# Patient Record
Sex: MALE | Race: WHITE | URBAN - METROPOLITAN AREA
[De-identification: names, ages, dates, MRNs, and addresses within clinical notes are randomized per-mention and may not be internally consistent; named-entity substitution may affect disease eponyms.]

---

## 2018-02-07 ENCOUNTER — OFFICE (OUTPATIENT)
Dept: URBAN - METROPOLITAN AREA OTHER 6 | Facility: OTHER | Age: 43
End: 2018-02-07

## 2018-02-07 VITALS
WEIGHT: 260 LBS | HEART RATE: 96 BPM | SYSTOLIC BLOOD PRESSURE: 120 MMHG | HEIGHT: 70 IN | DIASTOLIC BLOOD PRESSURE: 80 MMHG

## 2018-02-07 DIAGNOSIS — R10.32 LEFT LOWER QUADRANT PAIN: ICD-10-CM

## 2018-02-07 DIAGNOSIS — K57.32 DIVERTICULITIS OF LARGE INTESTINE WITHOUT PERFORATION OR ABS: ICD-10-CM

## 2018-02-07 PROCEDURE — 99213 OFFICE O/P EST LOW 20 MIN: CPT | Performed by: INTERNAL MEDICINE

## 2018-02-07 RX ORDER — LEVOFLOXACIN 500 MG/1
TABLET, FILM COATED ORAL
Qty: 7 | Refills: 0 | Status: COMPLETED
Start: 2018-02-07 | End: 2024-08-07

## 2018-02-07 RX ORDER — METRONIDAZOLE 500 MG/1
TABLET ORAL
Qty: 30 | Refills: 0 | Status: COMPLETED
End: 2024-08-07

## 2018-02-26 ENCOUNTER — OFFICE (OUTPATIENT)
Dept: URBAN - METROPOLITAN AREA OTHER 6 | Facility: OTHER | Age: 43
End: 2018-02-26
Payer: COMMERCIAL

## 2018-02-28 ENCOUNTER — OFFICE VISIT (OUTPATIENT)
Dept: SURGERY | Facility: CLINIC | Age: 43
End: 2018-02-28

## 2018-02-28 VITALS — BODY MASS INDEX: 37.22 KG/M2 | WEIGHT: 260 LBS | HEART RATE: 84 BPM | HEIGHT: 70 IN | OXYGEN SATURATION: 98 %

## 2018-02-28 DIAGNOSIS — K57.20 DIVERTICULITIS OF LARGE INTESTINE WITH ABSCESS WITHOUT BLEEDING: Primary | ICD-10-CM

## 2018-02-28 PROCEDURE — 99204 OFFICE O/P NEW MOD 45 MIN: CPT | Performed by: SURGERY

## 2018-02-28 RX ORDER — METRONIDAZOLE 500 MG/1
TABLET ORAL
Refills: 0 | COMMUNITY
Start: 2018-02-08 | End: 2018-03-21

## 2018-02-28 RX ORDER — LEVOFLOXACIN 500 MG/1
500 TABLET, FILM COATED ORAL DAILY
COMMUNITY
Start: 2018-02-06 | End: 2018-03-21

## 2018-02-28 NOTE — PROGRESS NOTES
CHIEF COMPLAINT:    Diverticulitis with abscess    HISTORY OF PRESENT ILLNESS:    Cody Gong is a 42 y.o. male who has a diverticulitis with intramural abscess of the sigmoid colon.  His symptoms started in late January.  He has been having episodes of diverticulitis for a few years.  He had some Flagyl on hand at home and started it around January 29, 2018.  He did not notice any improvement and went to see his primary doctor on 2/5/2018.  She started him on Bactrim in addition to the Flagyl.  She wanted to avoid Levaquin because he has Achilles tendinitis.  CT scan of the abdomen and pelvis were performed on 2/6/2018 that shows diverticulitis with an abscess.  He was instructed to come back to the hospital.  He was switched to Levaquin and Flagyl and discharged from the hospital.  The antibiotics were discontinued yesterday.  He feels better, but not back to completely normal.  There is still some left lower quadrant pain.  Bowel movements are loose.  He has not ventured much from a liquid diet.  There is no blood in stool.  There is no fever.     Past Medical History:   Diagnosis Date   • Asthma    • Diverticulitis    • Diverticulosis    • Hypertension        Past Surgical History:   Procedure Laterality Date   • APPENDECTOMY     • CHOLECYSTECTOMY     • COLONOSCOPY N/A 8/12/2016    Procedure: COLONOSCOPY to cecum;  Surgeon: Anand Zhang MD;  Location: [facility] ENDOSCOPY;  Service:    • COLONOSCOPY N/A 08/12/2011    Normal ileum, normal colon-Dr. Anand Zhang         Current Outpatient Prescriptions:   •  benazepril (LOTENSIN) 20 MG tablet, Take 20 mg by mouth daily., Disp: , Rfl:   •  levoFLOXacin (LEVAQUIN) 500 MG tablet, Take 500 mg by mouth Daily., Disp: , Rfl:   •  Loratadine (CLARITIN) 10 MG capsule, Take 1 tablet by mouth As Needed., Disp: , Rfl:   •  metroNIDAZOLE (FLAGYL) 500 MG tablet, TK 1 T PO TID FOR 10 DAYS UTD, Disp: , Rfl: 0  •  montelukast (SINGULAIR) 10 MG tablet, Take 10 mg by mouth every  "night., Disp: , Rfl:   •  vitamin E 400 UNIT capsule, Take 400 Units by mouth daily., Disp: , Rfl:   •  Flaxseed, Linseed, (FLAX SEED OIL) 1000 MG capsule, Take 1 capsule by mouth daily., Disp: , Rfl:     No Known Allergies    Family History   Problem Relation Age of Onset   • Colon polyps Mother    • Diabetes Mother    • Hypertension Mother    • Colon polyps Father    • Diabetes Father    • Hypertension Father        Social History     Social History   • Marital status:      Spouse name: N/A   • Number of children: N/A   • Years of education: N/A     Occupational History   • Not on file.     Social History Main Topics   • Smoking status: Former Smoker     Quit date: 8/12/2013   • Smokeless tobacco: Never Used   • Alcohol use 1.2 oz/week     2 Cans of beer per week   • Drug use: No   • Sexual activity: Defer     Other Topics Concern   • Not on file     Social History Narrative       Review of Systems   Gastrointestinal: Positive for abdominal pain and diarrhea.        Reflux   All other systems reviewed and are negative.      Objective     Pulse 84  Ht 177.8 cm (70\")  Wt 118 kg (260 lb)  SpO2 98%  BMI 37.31 kg/m2    Physical Exam   Constitutional: He is oriented to person, place, and time. He appears well-developed and well-nourished. No distress.   HENT:   Head: Normocephalic and atraumatic.   Right Ear: External ear normal.   Left Ear: External ear normal.   Nose: Nose normal.   Eyes: Conjunctivae are normal. No scleral icterus.   Neck: Neck supple. No tracheal deviation present.   Cardiovascular: Normal rate, regular rhythm and normal heart sounds.  Exam reveals no friction rub.    No murmur heard.  Pulmonary/Chest: Effort normal and breath sounds normal. No respiratory distress. He has no wheezes. He has no rales.   Abdominal: Soft. Bowel sounds are normal. He exhibits no distension and no mass. There is tenderness (mild left lower quadrant). There is no rebound and no guarding. No hernia. "   Musculoskeletal: He exhibits no edema.   Lymphadenopathy:     He has no cervical adenopathy.   Neurological: He is alert and oriented to person, place, and time.   Skin: Skin is warm and dry.   Psychiatric: He has a normal mood and affect. His behavior is normal. Thought content normal.   Nursing note and vitals reviewed.      DIAGNOSTIC DATA:    I reviewed the report of the CT scan of the abdomen and pelvis performed on 2/6/2018 at [facility] showing wall thickening, pericolonic inflammation, and a mural abscess of the sigmoid colon.  The report says that it is in a similar location as an episode of diverticulitis identified on CT imaging in 2011.    I reviewed his laboratory evaluation    ASSESSMENT:    Recurrent diverticulitis with perforation    PLAN:    I've recommended getting a CT imaging study now that he is off antibiotics to evaluate the site of the abscess and to evaluate the pericolonic inflammatory changes to assess the degree of improvement.  Ultimately, he will need to have a sigmoid colectomy, and we discussed the surgical procedure today in the office.  If the pericolonic inflammatory changes and abscess are improved, I think it is preferable to arrange for a laparoscopic procedure, with a low expectation for conversion to an open procedure or need for colostomy.    I will call him after I see the CT scan.

## 2018-03-08 ENCOUNTER — HOSPITAL ENCOUNTER (OUTPATIENT)
Dept: CT IMAGING | Facility: HOSPITAL | Age: 43
Discharge: HOME | End: 2018-03-08
Attending: SURGERY | Admitting: SURGERY

## 2018-03-08 DIAGNOSIS — K57.20 DIVERTICULITIS OF LARGE INTESTINE WITH ABSCESS WITHOUT BLEEDING: ICD-10-CM

## 2018-03-08 LAB — CREAT BLDA-MCNC: 0.9 MG/DL (ref 0.6–1.3)

## 2018-03-08 PROCEDURE — 0 IOPAMIDOL 61 % SOLUTION: Performed by: SURGERY

## 2018-03-08 PROCEDURE — 82565 ASSAY OF CREATININE: CPT

## 2018-03-08 PROCEDURE — 74177 CT ABD & PELVIS W/CONTRAST: CPT

## 2018-03-08 RX ADMIN — IOPAMIDOL 85 ML: 612 INJECTION, SOLUTION INTRAVENOUS at 10:00

## 2018-03-13 ENCOUNTER — TELEPHONE (OUTPATIENT)
Dept: SURGERY | Facility: CLINIC | Age: 43
End: 2018-03-13

## 2018-03-14 ENCOUNTER — TELEPHONE (OUTPATIENT)
Dept: SURGERY | Facility: CLINIC | Age: 43
End: 2018-03-14

## 2018-03-14 NOTE — TELEPHONE ENCOUNTER
I called him to talk about his CT scan.  The new scan shows that almost all of the inflammation is gone.   I would like to see him in the office in a week or two for another exam to decide about surgery.

## 2018-03-21 ENCOUNTER — OFFICE VISIT (OUTPATIENT)
Dept: SURGERY | Facility: CLINIC | Age: 43
End: 2018-03-21

## 2018-03-21 DIAGNOSIS — K57.20 DIVERTICULITIS OF LARGE INTESTINE WITH ABSCESS WITHOUT BLEEDING: Primary | ICD-10-CM

## 2018-03-21 PROCEDURE — 99213 OFFICE O/P EST LOW 20 MIN: CPT | Performed by: SURGERY

## 2018-03-22 NOTE — PROGRESS NOTES
CHIEF COMPLAINT:    Diverticulitis with abscess    HISTORY OF PRESENT ILLNESS:    Cody Gong is a 42 y.o. male who had diverticulitis with an intramural abscess of the sigmoid colon.  His symptoms started in late January.  He has been having episodes of diverticulitis for a few years.  He came to see me for the first time on 2/28/2018 for an opinion about surgery. He had his most recent flare in January.  He was admitted briefly to [facility].  He improved.  He is now off antibiotics.  At the beginning of it, he had a CT imaging study that showed diverticulitis with an intramural abscess.    At his previous appointment with me, he was not moving forward with his diet.  He was still having lower abdominal pain.  He was still having loose bowel movements.  This is now markedly improved.  He is eating normal food.  There is little if any pain.  Bowel movements are returning to normal. There is no blood in stool.  There is no fever.    I sent him for follow-up CT scan on 3/8/2018.  It shows diverticulosis.  There is very minimal stranding adjacent to the sigmoid colon where the recent episode of diverticulitis was located.    EXAM:    Alert. Oriented.  Lungs: Clear.  No wheezing.  No rales.  Heart: Regular.  Abdomen: Soft.  Nondistended.  Minimal tenderness in the left lower quadrant.  Bowel sounds are normal.  Extremities: Warm.     I reviewed the images and the report of a CT scan of the abdomen and pelvis performed on 3/8/2018.  I compared them to his prior images performed at [facility] on 2/6/2018.  There is considerable improvement- in fact I would call it resolution- of the inflammatory changes surrounding sigmoid colon.    ASSESSMENT:    Diverticulitis with abscess     PLAN:    We had a conversation today in the office regarding his past symptoms.  It is not clear to me from the location and quality of his prior symptoms that they were always diverticulitis of the sigmoid  colon.  Since the inflammatory changes seem to have completely resolved on the CT images, and his pain has nearly completely resolved, I have recommended a more conservative approach than I did at our last office visit.  We discussed the use of fiber supplements.  We talked about increasing his intake of water and maintaining better hydration.    He is going to call me if there is persistent or recurrent abdominal pain.

## 2021-02-02 ENCOUNTER — IMMUNIZATION (OUTPATIENT)
Dept: VACCINE CLINIC | Facility: HOSPITAL | Age: 46
End: 2021-02-02

## 2021-02-02 PROCEDURE — 0001A: CPT | Performed by: INTERNAL MEDICINE

## 2021-02-02 PROCEDURE — 91300 HC SARSCOV02 VAC 30MCG/0.3ML IM: CPT | Performed by: INTERNAL MEDICINE

## 2021-02-23 ENCOUNTER — IMMUNIZATION (OUTPATIENT)
Dept: VACCINE CLINIC | Facility: HOSPITAL | Age: 46
End: 2021-02-23

## 2021-02-23 PROCEDURE — 0002A: CPT | Performed by: INTERNAL MEDICINE

## 2021-02-23 PROCEDURE — 91300 HC SARSCOV02 VAC 30MCG/0.3ML IM: CPT | Performed by: INTERNAL MEDICINE

## 2021-05-05 ENCOUNTER — LAB REQUISITION (OUTPATIENT)
Dept: LAB | Facility: HOSPITAL | Age: 46
End: 2021-05-05

## 2021-05-05 DIAGNOSIS — Z00.00 ENCOUNTER FOR GENERAL ADULT MEDICAL EXAMINATION WITHOUT ABNORMAL FINDINGS: ICD-10-CM

## 2021-05-05 LAB — SARS-COV-2 ORF1AB RESP QL NAA+PROBE: NOT DETECTED

## 2021-05-05 PROCEDURE — U0004 COV-19 TEST NON-CDC HGH THRU: HCPCS | Performed by: INTERNAL MEDICINE

## 2021-05-10 ENCOUNTER — AMBULATORY SURGICAL CENTER (OUTPATIENT)
Dept: URBAN - METROPOLITAN AREA SURGERY 20 | Facility: SURGERY | Age: 46
End: 2021-05-10
Payer: COMMERCIAL

## 2021-05-10 ENCOUNTER — OFFICE (OUTPATIENT)
Dept: URBAN - METROPOLITAN AREA PATHOLOGY 4 | Facility: PATHOLOGY | Age: 46
End: 2021-05-10
Payer: COMMERCIAL

## 2021-05-10 VITALS — HEIGHT: 70 IN | WEIGHT: 260 LBS

## 2021-05-10 DIAGNOSIS — Z80.9 FAMILY HISTORY OF MALIGNANT NEOPLASM, UNSPECIFIED: ICD-10-CM

## 2021-05-10 DIAGNOSIS — Z12.11 ENCOUNTER FOR SCREENING FOR MALIGNANT NEOPLASM OF COLON: ICD-10-CM

## 2021-05-10 DIAGNOSIS — K63.5 POLYP OF COLON: ICD-10-CM

## 2021-05-10 DIAGNOSIS — K57.30 DIVERTICULOSIS OF LARGE INTESTINE WITHOUT PERFORATION OR ABS: ICD-10-CM

## 2021-05-10 DIAGNOSIS — K64.0 FIRST DEGREE HEMORRHOIDS: ICD-10-CM

## 2021-05-10 LAB
GI HISTOLOGY: A. SELECT: (no result)
GI HISTOLOGY: PDF REPORT: (no result)

## 2021-05-10 PROCEDURE — 45380 COLONOSCOPY AND BIOPSY: CPT | Mod: 33 | Performed by: INTERNAL MEDICINE

## 2021-05-10 PROCEDURE — 88305 TISSUE EXAM BY PATHOLOGIST: CPT | Mod: 33 | Performed by: INTERNAL MEDICINE

## 2024-08-07 ENCOUNTER — OFFICE (OUTPATIENT)
Dept: URBAN - METROPOLITAN AREA CLINIC 76 | Facility: CLINIC | Age: 49
End: 2024-08-07
Payer: COMMERCIAL

## 2024-08-07 VITALS
HEIGHT: 70 IN | WEIGHT: 306 LBS | DIASTOLIC BLOOD PRESSURE: 82 MMHG | HEART RATE: 117 BPM | SYSTOLIC BLOOD PRESSURE: 138 MMHG | OXYGEN SATURATION: 97 %

## 2024-08-07 DIAGNOSIS — E66.9 OBESITY, UNSPECIFIED: ICD-10-CM

## 2024-08-07 DIAGNOSIS — Z80.9 FAMILY HISTORY OF MALIGNANT NEOPLASM, UNSPECIFIED: ICD-10-CM

## 2024-08-07 DIAGNOSIS — K57.32 DIVERTICULITIS OF LARGE INTESTINE WITHOUT PERFORATION OR ABS: ICD-10-CM

## 2024-08-07 DIAGNOSIS — K58.9 IRRITABLE BOWEL SYNDROME WITHOUT DIARRHEA: ICD-10-CM

## 2024-08-07 PROCEDURE — 99203 OFFICE O/P NEW LOW 30 MIN: CPT | Performed by: INTERNAL MEDICINE

## 2024-08-07 RX ORDER — DICYCLOMINE HYDROCHLORIDE 20 MG/1
60 TABLET ORAL
Qty: 120 | Refills: 12 | Status: ACTIVE
Start: 2024-08-07

## 2024-08-07 NOTE — SERVICEHPINOTES
has diverticulitis every 3 months and food reactions he experiences.   every few months feels symptoms in lower abdomen.   trieds to do bland foods.    using align every day, garlic,   tried metamucil, benefiber.   have some leakage with sneazing.